# Patient Record
Sex: MALE | Race: WHITE
[De-identification: names, ages, dates, MRNs, and addresses within clinical notes are randomized per-mention and may not be internally consistent; named-entity substitution may affect disease eponyms.]

---

## 2019-01-22 ENCOUNTER — HOSPITAL ENCOUNTER (EMERGENCY)
Dept: HOSPITAL 53 - M ED | Age: 24
Discharge: HOME | End: 2019-01-22
Payer: COMMERCIAL

## 2019-01-22 VITALS — WEIGHT: 160.34 LBS | HEIGHT: 72 IN | BODY MASS INDEX: 21.72 KG/M2

## 2019-01-22 VITALS — SYSTOLIC BLOOD PRESSURE: 121 MMHG | DIASTOLIC BLOOD PRESSURE: 59 MMHG

## 2019-01-22 DIAGNOSIS — Z77.098: ICD-10-CM

## 2019-01-22 DIAGNOSIS — N13.2: Primary | ICD-10-CM

## 2019-01-22 DIAGNOSIS — N23: ICD-10-CM

## 2019-01-22 LAB
ALBUMIN SERPL BCG-MCNC: 4.3 GM/DL (ref 3.2–5.2)
ALT SERPL W P-5'-P-CCNC: 19 U/L (ref 12–78)
BASOPHILS # BLD AUTO: 0.1 10^3/UL (ref 0–0.2)
BASOPHILS NFR BLD AUTO: 0.3 % (ref 0–1)
BILIRUB CONJ SERPL-MCNC: 0.2 MG/DL (ref 0–0.2)
BILIRUB SERPL-MCNC: 0.9 MG/DL (ref 0.2–1)
BUN SERPL-MCNC: 11 MG/DL (ref 7–18)
CALCIUM SERPL-MCNC: 9.4 MG/DL (ref 8.5–10.1)
CHLAMYDIA DNA AMPLIFICATION: NEGATIVE
CHLORIDE SERPL-SCNC: 108 MEQ/L (ref 98–107)
CO2 SERPL-SCNC: 25 MEQ/L (ref 21–32)
CREAT SERPL-MCNC: 1.15 MG/DL (ref 0.7–1.3)
EOSINOPHIL # BLD AUTO: 0 10^3/UL (ref 0–0.5)
EOSINOPHIL NFR BLD AUTO: 0.2 % (ref 0–3)
GFR SERPL CREATININE-BSD FRML MDRD: > 60 ML/MIN/{1.73_M2} (ref 60–?)
GLUCOSE SERPL-MCNC: 103 MG/DL (ref 70–100)
HCT VFR BLD AUTO: 42.4 % (ref 42–52)
HGB BLD-MCNC: 15.2 G/DL (ref 13.5–17.5)
LIPASE SERPL-CCNC: 58 U/L (ref 73–393)
LYMPHOCYTES # BLD AUTO: 1.3 10^3/UL (ref 1.5–6.5)
LYMPHOCYTES NFR BLD AUTO: 7.2 % (ref 24–44)
MCH RBC QN AUTO: 31.3 PG (ref 27–33)
MCHC RBC AUTO-ENTMCNC: 35.8 G/DL (ref 32–36.5)
MCV RBC AUTO: 87.4 FL (ref 80–96)
MONOCYTES # BLD AUTO: 1 10^3/UL (ref 0–0.8)
MONOCYTES NFR BLD AUTO: 5.6 % (ref 0–5)
N GONORRHOEA RRNA SPEC QL NAA+PROBE: NEGATIVE
NEUTROPHILS # BLD AUTO: 15 10^3/UL (ref 1.8–7.7)
NEUTROPHILS NFR BLD AUTO: 86.4 % (ref 36–66)
PLATELET # BLD AUTO: 298 10^3/UL (ref 150–450)
POTASSIUM SERPL-SCNC: 3.9 MEQ/L (ref 3.5–5.1)
PROT SERPL-MCNC: 7.4 GM/DL (ref 6.4–8.2)
RBC # BLD AUTO: 4.85 10^6/UL (ref 4.3–6.1)
SODIUM SERPL-SCNC: 142 MEQ/L (ref 136–145)
WBC # BLD AUTO: 17.3 10^3/UL (ref 4–10)

## 2019-01-22 PROCEDURE — 87591 N.GONORRHOEAE DNA AMP PROB: CPT

## 2019-01-22 PROCEDURE — 80048 BASIC METABOLIC PNL TOTAL CA: CPT

## 2019-01-22 PROCEDURE — 96361 HYDRATE IV INFUSION ADD-ON: CPT

## 2019-01-22 PROCEDURE — 87491 CHLMYD TRACH DNA AMP PROBE: CPT

## 2019-01-22 PROCEDURE — 96375 TX/PRO/DX INJ NEW DRUG ADDON: CPT

## 2019-01-22 PROCEDURE — 83690 ASSAY OF LIPASE: CPT

## 2019-01-22 PROCEDURE — 74177 CT ABD & PELVIS W/CONTRAST: CPT

## 2019-01-22 PROCEDURE — 81001 URINALYSIS AUTO W/SCOPE: CPT

## 2019-01-22 PROCEDURE — 85025 COMPLETE CBC W/AUTO DIFF WBC: CPT

## 2019-01-22 PROCEDURE — 99284 EMERGENCY DEPT VISIT MOD MDM: CPT

## 2019-01-22 PROCEDURE — 80076 HEPATIC FUNCTION PANEL: CPT

## 2019-01-22 PROCEDURE — 96374 THER/PROPH/DIAG INJ IV PUSH: CPT

## 2019-01-22 NOTE — REP
CT abdomen and pelvis with IV but without oral contrast:

 

History:  Right lower quadrant and back pain.

 

CT contrast dose:  100 ml of Isovue 370 administered intravenously.

 

CT findings:  Digital preliminary  radiograph shows a normal bowel gas

pattern.  The lung bases are clear on axial CT images.  The liver and the spleen

are normal in size and homogeneous in texture.  No adrenal lesion is seen.

Pancreas is unremarkable.  Gallbladder shows no abnormality.  No retroperitoneal

mass or adenopathy is observed.

 

There is a mild obstructive nephrogram on the left side with moderate left-sided

hydronephrosis and hydroureter.  The left hydroureter can be traced into the

pelvis where there is a 3 mm obstructing distal ureteral calculus located 2-3 cm

above the ureterovesical junction.  There is 2 mm intrarenal calculus in the

lower pole of the right kidney.  No other intrarenal calculus is seen.  There is

an accessory renal artery to the lower pole on the left.  A retroaortic left

renal vein is noted.  No abdominal wall defect is seen.  Bone window settings

show no bony destructive lesion. A short but noninflamed appendix is visible in

the right central pelvis.

 

Impression:

 

Obstructive 3 mm left distal ureteral calculus several centimeters above the

ureterovesical junction.  Left-sided hydronephrosis and obstructive nephrogram.

2 mm intrarenal calculus lower pole right kidney.  Otherwise negative CT study of

the abdomen and pelvis. Normal appendix.

 

 

Electronically Signed by

Markie Chew MD 01/22/2019 05:40 P